# Patient Record
Sex: MALE | Race: WHITE
[De-identification: names, ages, dates, MRNs, and addresses within clinical notes are randomized per-mention and may not be internally consistent; named-entity substitution may affect disease eponyms.]

---

## 2017-06-17 ENCOUNTER — HOSPITAL ENCOUNTER (EMERGENCY)
Dept: HOSPITAL 15 - ER | Age: 77
Discharge: HOME | End: 2017-06-17
Payer: MEDICARE

## 2017-06-17 VITALS — SYSTOLIC BLOOD PRESSURE: 130 MMHG | DIASTOLIC BLOOD PRESSURE: 85 MMHG

## 2017-06-17 VITALS — WEIGHT: 215 LBS | BODY MASS INDEX: 29.12 KG/M2 | HEIGHT: 72 IN

## 2017-06-17 DIAGNOSIS — E11.9: ICD-10-CM

## 2017-06-17 DIAGNOSIS — Z88.8: ICD-10-CM

## 2017-06-17 DIAGNOSIS — F17.210: ICD-10-CM

## 2017-06-17 DIAGNOSIS — I10: ICD-10-CM

## 2017-06-17 DIAGNOSIS — T78.40XA: Primary | ICD-10-CM

## 2019-09-26 ENCOUNTER — HOSPITAL ENCOUNTER (INPATIENT)
Dept: HOSPITAL 15 - ER | Age: 79
LOS: 5 days | Discharge: NURSING, REHAB OR LONG-TERM CARE | DRG: 91 | End: 2019-10-01
Attending: INTERNAL MEDICINE | Admitting: NURSE PRACTITIONER
Payer: MEDICARE

## 2019-09-26 VITALS — HEIGHT: 73 IN | BODY MASS INDEX: 24.75 KG/M2 | WEIGHT: 186.73 LBS

## 2019-09-26 DIAGNOSIS — N18.9: ICD-10-CM

## 2019-09-26 DIAGNOSIS — F23: ICD-10-CM

## 2019-09-26 DIAGNOSIS — S16.1XXA: ICD-10-CM

## 2019-09-26 DIAGNOSIS — E11.21: ICD-10-CM

## 2019-09-26 DIAGNOSIS — M46.90: ICD-10-CM

## 2019-09-26 DIAGNOSIS — I27.21: ICD-10-CM

## 2019-09-26 DIAGNOSIS — Z79.899: ICD-10-CM

## 2019-09-26 DIAGNOSIS — N17.0: ICD-10-CM

## 2019-09-26 DIAGNOSIS — Z88.8: ICD-10-CM

## 2019-09-26 DIAGNOSIS — D63.8: ICD-10-CM

## 2019-09-26 DIAGNOSIS — Z82.0: ICD-10-CM

## 2019-09-26 DIAGNOSIS — I25.2: ICD-10-CM

## 2019-09-26 DIAGNOSIS — N39.0: ICD-10-CM

## 2019-09-26 DIAGNOSIS — I13.0: ICD-10-CM

## 2019-09-26 DIAGNOSIS — Z95.5: ICD-10-CM

## 2019-09-26 DIAGNOSIS — D72.829: ICD-10-CM

## 2019-09-26 DIAGNOSIS — E11.22: ICD-10-CM

## 2019-09-26 DIAGNOSIS — I50.22: ICD-10-CM

## 2019-09-26 DIAGNOSIS — I25.10: ICD-10-CM

## 2019-09-26 DIAGNOSIS — I27.20: ICD-10-CM

## 2019-09-26 DIAGNOSIS — I69.354: ICD-10-CM

## 2019-09-26 DIAGNOSIS — E44.0: ICD-10-CM
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
## 2019-09-26 DIAGNOSIS — M48.02: ICD-10-CM

## 2019-09-26 DIAGNOSIS — M25.78: ICD-10-CM

## 2019-09-26 DIAGNOSIS — M53.82: ICD-10-CM
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
## 2019-09-26 DIAGNOSIS — I69.393: ICD-10-CM

## 2019-09-26 DIAGNOSIS — S70.01XA: ICD-10-CM

## 2019-09-26 DIAGNOSIS — K21.9: ICD-10-CM

## 2019-09-26 DIAGNOSIS — G92: Primary | ICD-10-CM

## 2019-09-26 DIAGNOSIS — E11.40: ICD-10-CM

## 2019-09-26 DIAGNOSIS — E78.5: ICD-10-CM
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
## 2019-09-26 LAB
ALBUMIN SERPL-MCNC: 2.8 G/DL (ref 3.4–5)
ALP SERPL-CCNC: 83 U/L (ref 45–117)
ALT SERPL-CCNC: 46 U/L (ref 16–61)
ANION GAP SERPL CALCULATED.3IONS-SCNC: 6 MMOL/L (ref 5–15)
BILIRUB SERPL-MCNC: 0.6 MG/DL (ref 0.2–1)
BUN SERPL-MCNC: 20 MG/DL (ref 7–18)
BUN/CREAT SERPL: 21.1
CALCIUM SERPL-MCNC: 9 MG/DL (ref 8.5–10.1)
CHLORIDE SERPL-SCNC: 105 MMOL/L (ref 98–107)
CO2 SERPL-SCNC: 27 MMOL/L (ref 21–32)
GLUCOSE SERPL-MCNC: 145 MG/DL (ref 74–106)
HCT VFR BLD AUTO: 39.2 % (ref 41–53)
HGB BLD-MCNC: 12.9 G/DL (ref 13.5–17.5)
MCH RBC QN AUTO: 27.8 PG (ref 28–32)
MCV RBC AUTO: 84.8 FL (ref 80–100)
NRBC BLD QL AUTO: 0 %
POTASSIUM SERPL-SCNC: 4.1 MMOL/L (ref 3.5–5.1)
PROT SERPL-MCNC: 7.3 G/DL (ref 6.4–8.2)
SODIUM SERPL-SCNC: 138 MMOL/L (ref 136–145)

## 2019-09-26 PROCEDURE — 87081 CULTURE SCREEN ONLY: CPT

## 2019-09-26 PROCEDURE — 82962 GLUCOSE BLOOD TEST: CPT

## 2019-09-26 PROCEDURE — 84484 ASSAY OF TROPONIN QUANT: CPT

## 2019-09-26 PROCEDURE — 73502 X-RAY EXAM HIP UNI 2-3 VIEWS: CPT

## 2019-09-26 PROCEDURE — 96365 THER/PROPH/DIAG IV INF INIT: CPT

## 2019-09-26 PROCEDURE — 81001 URINALYSIS AUTO W/SCOPE: CPT

## 2019-09-26 PROCEDURE — 97530 THERAPEUTIC ACTIVITIES: CPT

## 2019-09-26 PROCEDURE — 96361 HYDRATE IV INFUSION ADD-ON: CPT

## 2019-09-26 PROCEDURE — 80048 BASIC METABOLIC PNL TOTAL CA: CPT

## 2019-09-26 PROCEDURE — 82533 TOTAL CORTISOL: CPT

## 2019-09-26 PROCEDURE — 97163 PT EVAL HIGH COMPLEX 45 MIN: CPT

## 2019-09-26 PROCEDURE — 36415 COLL VENOUS BLD VENIPUNCTURE: CPT

## 2019-09-26 PROCEDURE — 85025 COMPLETE CBC W/AUTO DIFF WBC: CPT

## 2019-09-26 PROCEDURE — 84443 ASSAY THYROID STIM HORMONE: CPT

## 2019-09-26 PROCEDURE — 72125 CT NECK SPINE W/O DYE: CPT

## 2019-09-26 PROCEDURE — 97110 THERAPEUTIC EXERCISES: CPT

## 2019-09-26 PROCEDURE — 83036 HEMOGLOBIN GLYCOSYLATED A1C: CPT

## 2019-09-26 PROCEDURE — 80053 COMPREHEN METABOLIC PANEL: CPT

## 2019-09-26 PROCEDURE — 70450 CT HEAD/BRAIN W/O DYE: CPT

## 2019-09-27 VITALS — DIASTOLIC BLOOD PRESSURE: 77 MMHG | SYSTOLIC BLOOD PRESSURE: 127 MMHG

## 2019-09-27 VITALS — SYSTOLIC BLOOD PRESSURE: 107 MMHG | DIASTOLIC BLOOD PRESSURE: 67 MMHG

## 2019-09-27 VITALS — DIASTOLIC BLOOD PRESSURE: 66 MMHG | SYSTOLIC BLOOD PRESSURE: 113 MMHG

## 2019-09-27 RX ADMIN — Medication SCH STRIP: at 17:38

## 2019-09-27 RX ADMIN — Medication SCH STRIP: at 11:32

## 2019-09-27 RX ADMIN — METOPROLOL TARTRATE SCH MG: 25 TABLET, FILM COATED ORAL at 21:37

## 2019-09-27 RX ADMIN — HUMAN INSULIN SCH UNITS: 100 INJECTION, SOLUTION SUBCUTANEOUS at 21:37

## 2019-09-27 RX ADMIN — SILDENAFIL CITRATE SCH MG: 20 TABLET, FILM COATED ORAL at 19:50

## 2019-09-27 RX ADMIN — Medication SCH STRIP: at 21:37

## 2019-09-27 RX ADMIN — CEFTRIAXONE SODIUM SCH MLS/HR: 1 INJECTION, POWDER, FOR SOLUTION INTRAMUSCULAR; INTRAVENOUS at 11:48

## 2019-09-27 RX ADMIN — HUMAN INSULIN SCH UNITS: 100 INJECTION, SOLUTION SUBCUTANEOUS at 11:30

## 2019-09-27 RX ADMIN — SILDENAFIL CITRATE SCH MG: 20 TABLET, FILM COATED ORAL at 14:17

## 2019-09-27 RX ADMIN — HUMAN INSULIN SCH UNITS: 100 INJECTION, SOLUTION SUBCUTANEOUS at 17:38

## 2019-09-27 NOTE — NUR
RECEIVED PATIENT FROM DAY SHIFT RN. PATIENT RESTING IN BED. NO S/S OF DISTRESS NOTED. DENIED 
PAIN FOR NOW. REORIENTED PATIENT FOR SITUATION. ORAL MEDICATION GIVEN AS ORDERED. PATIENT 
SWALLOWED WELL. NO S/S OF ASPIRATION NOTED. REPOSITIONED PATIENT TO COMFORT. POC INSTRUCTED 
AND ENCOURAGED PATIENT TO CALL FOR ASSISTANT IF NEEDED. BED IN LOWEST POSITION WITH SIDE 
RAILS UP X 2. CALL BELL WITHIN REACH. ALARM ON. SITTER AT BEDSIDE FOR SAFETY. CONTINUE TO 
MONITOR FOR CHANGES Q1H AND PRN.

## 2019-09-27 NOTE — NUR
ACCU-CHECK, BS 86. NO COVERAGE. ENCOURAGED PATIENT TO DRINK JUICY TO INCREASE BS. CONTINUE 
TO MONITOR.

## 2019-09-27 NOTE — NUR
Telemetry admit from ER

AYAKA STARKEY admitted to Telemetry unit after SBAR received. Patient oriented to KEITH MCDOWELL RN primary RN, unit, room, bed, and unit policies regarding patient care and 
visiting hours. Sitter is at bedside for 24 hour monitoring and care. Patient now on 
continuous telemetry monitoring, tele box # 65 and telemetry reading on arrival to unit is 
normal sinus rhythm at 98 bpm. Patient weighed by bed scale and encouraged to call if they 
need something. All questions and concerns addressed, patient verbalized understanding to 
the best of his ability.

## 2019-09-28 VITALS — DIASTOLIC BLOOD PRESSURE: 62 MMHG | SYSTOLIC BLOOD PRESSURE: 110 MMHG

## 2019-09-28 VITALS — DIASTOLIC BLOOD PRESSURE: 69 MMHG | SYSTOLIC BLOOD PRESSURE: 102 MMHG

## 2019-09-28 VITALS — SYSTOLIC BLOOD PRESSURE: 107 MMHG | DIASTOLIC BLOOD PRESSURE: 70 MMHG

## 2019-09-28 VITALS — SYSTOLIC BLOOD PRESSURE: 110 MMHG | DIASTOLIC BLOOD PRESSURE: 74 MMHG

## 2019-09-28 VITALS — SYSTOLIC BLOOD PRESSURE: 119 MMHG | DIASTOLIC BLOOD PRESSURE: 79 MMHG

## 2019-09-28 VITALS — SYSTOLIC BLOOD PRESSURE: 127 MMHG | DIASTOLIC BLOOD PRESSURE: 72 MMHG

## 2019-09-28 LAB
ANION GAP SERPL CALCULATED.3IONS-SCNC: 8 MMOL/L (ref 5–15)
BUN SERPL-MCNC: 14 MG/DL (ref 7–18)
BUN/CREAT SERPL: 17.1
CALCIUM SERPL-MCNC: 8.7 MG/DL (ref 8.5–10.1)
CHLORIDE SERPL-SCNC: 104 MMOL/L (ref 98–107)
CO2 SERPL-SCNC: 26 MMOL/L (ref 21–32)
GLUCOSE SERPL-MCNC: 121 MG/DL (ref 74–106)
HCT VFR BLD AUTO: 36.3 % (ref 41–53)
HGB BLD-MCNC: 12.1 G/DL (ref 13.5–17.5)
MCH RBC QN AUTO: 28.3 PG (ref 28–32)
MCV RBC AUTO: 84.9 FL (ref 80–100)
NRBC BLD QL AUTO: 0 %
POTASSIUM SERPL-SCNC: 3.9 MMOL/L (ref 3.5–5.1)
SODIUM SERPL-SCNC: 138 MMOL/L (ref 136–145)

## 2019-09-28 RX ADMIN — Medication SCH STRIP: at 06:42

## 2019-09-28 RX ADMIN — Medication SCH ML: at 16:57

## 2019-09-28 RX ADMIN — SILDENAFIL CITRATE SCH MG: 20 TABLET, FILM COATED ORAL at 14:15

## 2019-09-28 RX ADMIN — HUMAN INSULIN SCH UNITS: 100 INJECTION, SOLUTION SUBCUTANEOUS at 16:56

## 2019-09-28 RX ADMIN — METOPROLOL TARTRATE SCH MG: 25 TABLET, FILM COATED ORAL at 10:57

## 2019-09-28 RX ADMIN — CEFTRIAXONE SODIUM SCH MLS/HR: 1 INJECTION, POWDER, FOR SOLUTION INTRAMUSCULAR; INTRAVENOUS at 08:52

## 2019-09-28 RX ADMIN — METOPROLOL TARTRATE SCH MG: 25 TABLET, FILM COATED ORAL at 22:00

## 2019-09-28 RX ADMIN — Medication SCH STRIP: at 22:00

## 2019-09-28 RX ADMIN — ASPIRIN SCH MG: 81 TABLET ORAL at 10:55

## 2019-09-28 RX ADMIN — HUMAN INSULIN SCH UNITS: 100 INJECTION, SOLUTION SUBCUTANEOUS at 06:42

## 2019-09-28 RX ADMIN — Medication SCH STRIP: at 16:56

## 2019-09-28 RX ADMIN — LISINOPRIL SCH MG: 5 TABLET ORAL at 10:56

## 2019-09-28 RX ADMIN — CLOPIDOGREL BISULFATE SCH MG: 75 TABLET ORAL at 10:56

## 2019-09-28 RX ADMIN — HUMAN INSULIN SCH UNITS: 100 INJECTION, SOLUTION SUBCUTANEOUS at 11:55

## 2019-09-28 RX ADMIN — Medication SCH STRIP: at 11:55

## 2019-09-28 RX ADMIN — CEFTRIAXONE SODIUM SCH MLS/HR: 1 INJECTION, POWDER, FOR SOLUTION INTRAMUSCULAR; INTRAVENOUS at 08:51

## 2019-09-28 RX ADMIN — HYDROCODONE BITARTRATE AND ACETAMINOPHEN PRN TAB: 5; 325 TABLET ORAL at 11:56

## 2019-09-28 RX ADMIN — SILDENAFIL CITRATE SCH MG: 20 TABLET, FILM COATED ORAL at 08:51

## 2019-09-28 RX ADMIN — BACLOFEN PRN MG: 10 TABLET ORAL at 16:55

## 2019-09-28 RX ADMIN — FAMOTIDINE SCH MG: 20 TABLET, FILM COATED ORAL at 10:56

## 2019-09-28 RX ADMIN — HUMAN INSULIN SCH UNITS: 100 INJECTION, SOLUTION SUBCUTANEOUS at 22:00

## 2019-09-28 RX ADMIN — SILDENAFIL CITRATE SCH MG: 20 TABLET, FILM COATED ORAL at 22:26

## 2019-09-28 NOTE — NUR
Opening Shift Note

Assumed care of patient, awake and alert x3.  No S/S of distress/SOB or pain.  Instructed on 
POC and to call for assist PRN, will continue to monitor. Bed locked in the lowest position. 
Bed rails up x2. Call light in reach. Sitter at the bedside for safety.

## 2019-09-28 NOTE — NUR
Closing Note

Patient is awake and alert x3.  No S/S of distress/SOB or pain.  Bed locked in the lowest 
position. Bed rails up x2. Call light in reach. Sitter at the bedside for safety. Endorsed 
care to night nurse.

## 2019-09-28 NOTE — NUR
MED REQUEST 

PATIENT REQUESTING MEDICATION OF BACLOFEN. PATIENT STATES, "I WAS GETTING BACLOFEN BEFORE 
AND THAT REALLY HELPED WITH MY CHRONIC BACK PAIN. CAN YOU ASK THE DOCTOR IF I CAN GET AN 
ORDER FOR THAT." DR. BARBOSA AWARE OF PATIENTS REQUEST. NEW ORDER RECEIVED. ORDER READ BACK 
AND VERIFIED.

## 2019-09-29 VITALS — SYSTOLIC BLOOD PRESSURE: 107 MMHG | DIASTOLIC BLOOD PRESSURE: 67 MMHG

## 2019-09-29 VITALS — DIASTOLIC BLOOD PRESSURE: 68 MMHG | SYSTOLIC BLOOD PRESSURE: 110 MMHG

## 2019-09-29 VITALS — SYSTOLIC BLOOD PRESSURE: 137 MMHG | DIASTOLIC BLOOD PRESSURE: 78 MMHG

## 2019-09-29 VITALS — DIASTOLIC BLOOD PRESSURE: 78 MMHG | SYSTOLIC BLOOD PRESSURE: 111 MMHG

## 2019-09-29 VITALS — SYSTOLIC BLOOD PRESSURE: 141 MMHG | DIASTOLIC BLOOD PRESSURE: 81 MMHG

## 2019-09-29 LAB
ANION GAP SERPL CALCULATED.3IONS-SCNC: 12 MMOL/L (ref 5–15)
BUN SERPL-MCNC: 14 MG/DL (ref 7–18)
BUN/CREAT SERPL: 17.5
CALCIUM SERPL-MCNC: 8.9 MG/DL (ref 8.5–10.1)
CHLORIDE SERPL-SCNC: 103 MMOL/L (ref 98–107)
CO2 SERPL-SCNC: 22 MMOL/L (ref 21–32)
GLUCOSE SERPL-MCNC: 137 MG/DL (ref 74–106)
HCT VFR BLD AUTO: 37.9 % (ref 41–53)
HGB BLD-MCNC: 12.8 G/DL (ref 13.5–17.5)
MCH RBC QN AUTO: 28.5 PG (ref 28–32)
MCV RBC AUTO: 84.6 FL (ref 80–100)
NRBC BLD QL AUTO: 0.1 %
POTASSIUM SERPL-SCNC: 3.7 MMOL/L (ref 3.5–5.1)
SODIUM SERPL-SCNC: 137 MMOL/L (ref 136–145)

## 2019-09-29 RX ADMIN — HUMAN INSULIN SCH UNITS: 100 INJECTION, SOLUTION SUBCUTANEOUS at 22:07

## 2019-09-29 RX ADMIN — METOPROLOL TARTRATE SCH MG: 25 TABLET, FILM COATED ORAL at 22:08

## 2019-09-29 RX ADMIN — Medication SCH STRIP: at 22:08

## 2019-09-29 RX ADMIN — CEFTRIAXONE SODIUM SCH MLS/HR: 1 INJECTION, POWDER, FOR SOLUTION INTRAMUSCULAR; INTRAVENOUS at 09:02

## 2019-09-29 RX ADMIN — HYDROCODONE BITARTRATE AND ACETAMINOPHEN PRN TAB: 5; 325 TABLET ORAL at 09:02

## 2019-09-29 RX ADMIN — Medication SCH ML: at 12:32

## 2019-09-29 RX ADMIN — Medication SCH ML: at 18:26

## 2019-09-29 RX ADMIN — Medication SCH STRIP: at 12:32

## 2019-09-29 RX ADMIN — LISINOPRIL SCH MG: 5 TABLET ORAL at 09:05

## 2019-09-29 RX ADMIN — Medication SCH STRIP: at 06:47

## 2019-09-29 RX ADMIN — CLOPIDOGREL BISULFATE SCH MG: 75 TABLET ORAL at 09:04

## 2019-09-29 RX ADMIN — SILDENAFIL CITRATE SCH MG: 20 TABLET, FILM COATED ORAL at 20:47

## 2019-09-29 RX ADMIN — FAMOTIDINE SCH MG: 20 TABLET, FILM COATED ORAL at 09:03

## 2019-09-29 RX ADMIN — SILDENAFIL CITRATE SCH MG: 20 TABLET, FILM COATED ORAL at 09:02

## 2019-09-29 RX ADMIN — METOPROLOL TARTRATE SCH MG: 25 TABLET, FILM COATED ORAL at 09:04

## 2019-09-29 RX ADMIN — SILDENAFIL CITRATE SCH MG: 20 TABLET, FILM COATED ORAL at 16:21

## 2019-09-29 RX ADMIN — ASPIRIN SCH MG: 81 TABLET ORAL at 09:04

## 2019-09-29 RX ADMIN — Medication SCH ML: at 09:07

## 2019-09-29 RX ADMIN — HUMAN INSULIN SCH UNITS: 100 INJECTION, SOLUTION SUBCUTANEOUS at 06:47

## 2019-09-29 RX ADMIN — HUMAN INSULIN SCH UNITS: 100 INJECTION, SOLUTION SUBCUTANEOUS at 17:14

## 2019-09-29 RX ADMIN — Medication SCH STRIP: at 17:14

## 2019-09-29 RX ADMIN — HUMAN INSULIN SCH UNITS: 100 INJECTION, SOLUTION SUBCUTANEOUS at 12:32

## 2019-09-29 NOTE — NUR
PT TURNING SELF; AND PERIODICALLY TRYING TO KICK STAFF. INTERMITTENT PERIODS OF CALM WHEN PT 
SPORADICALLY FALLS ASLEEP;VITAL SIGNS STABLE; SITTER AT BEDSIDE SIDERAILS UP.

## 2019-09-29 NOTE — NUR
Opening Shift Note

Assumed care of patient, awake and alert x4. No S/S of distress/SOB or pain. Call light is 
within reach, side rails up x2, bed is in lowest position. Sitter is at bedside for safety. 
Instructed on POC and to call for assist PRN, will continue to monitor for changes Q1hr and 
PRN.

## 2019-09-30 VITALS — DIASTOLIC BLOOD PRESSURE: 87 MMHG | SYSTOLIC BLOOD PRESSURE: 141 MMHG

## 2019-09-30 VITALS — SYSTOLIC BLOOD PRESSURE: 102 MMHG | DIASTOLIC BLOOD PRESSURE: 62 MMHG

## 2019-09-30 VITALS — SYSTOLIC BLOOD PRESSURE: 124 MMHG | DIASTOLIC BLOOD PRESSURE: 74 MMHG

## 2019-09-30 VITALS — DIASTOLIC BLOOD PRESSURE: 68 MMHG | SYSTOLIC BLOOD PRESSURE: 116 MMHG

## 2019-09-30 VITALS — SYSTOLIC BLOOD PRESSURE: 141 MMHG | DIASTOLIC BLOOD PRESSURE: 87 MMHG

## 2019-09-30 VITALS — DIASTOLIC BLOOD PRESSURE: 81 MMHG | SYSTOLIC BLOOD PRESSURE: 117 MMHG

## 2019-09-30 LAB
ANION GAP SERPL CALCULATED.3IONS-SCNC: 9 MMOL/L (ref 5–15)
BUN SERPL-MCNC: 14 MG/DL (ref 7–18)
BUN/CREAT SERPL: 17.3
CALCIUM SERPL-MCNC: 8.7 MG/DL (ref 8.5–10.1)
CHLORIDE SERPL-SCNC: 103 MMOL/L (ref 98–107)
CO2 SERPL-SCNC: 24 MMOL/L (ref 21–32)
GLUCOSE SERPL-MCNC: 148 MG/DL (ref 74–106)
HCT VFR BLD AUTO: 37.6 % (ref 41–53)
HGB BLD-MCNC: 12.5 G/DL (ref 13.5–17.5)
MCH RBC QN AUTO: 28.4 PG (ref 28–32)
MCV RBC AUTO: 85.2 FL (ref 80–100)
NRBC BLD QL AUTO: 0 %
POTASSIUM SERPL-SCNC: 3.7 MMOL/L (ref 3.5–5.1)
SODIUM SERPL-SCNC: 136 MMOL/L (ref 136–145)

## 2019-09-30 RX ADMIN — BACLOFEN PRN MG: 10 TABLET ORAL at 21:27

## 2019-09-30 RX ADMIN — HUMAN INSULIN SCH UNITS: 100 INJECTION, SOLUTION SUBCUTANEOUS at 06:26

## 2019-09-30 RX ADMIN — CEFTRIAXONE SODIUM SCH MLS/HR: 1 INJECTION, POWDER, FOR SOLUTION INTRAMUSCULAR; INTRAVENOUS at 11:19

## 2019-09-30 RX ADMIN — HUMAN INSULIN SCH UNITS: 100 INJECTION, SOLUTION SUBCUTANEOUS at 18:23

## 2019-09-30 RX ADMIN — Medication SCH STRIP: at 21:27

## 2019-09-30 RX ADMIN — SILDENAFIL CITRATE SCH MG: 20 TABLET, FILM COATED ORAL at 13:53

## 2019-09-30 RX ADMIN — Medication SCH ML: at 18:24

## 2019-09-30 RX ADMIN — Medication SCH ML: at 12:29

## 2019-09-30 RX ADMIN — METOPROLOL TARTRATE SCH MG: 25 TABLET, FILM COATED ORAL at 21:38

## 2019-09-30 RX ADMIN — Medication SCH STRIP: at 12:29

## 2019-09-30 RX ADMIN — CLOPIDOGREL BISULFATE SCH MG: 75 TABLET ORAL at 11:19

## 2019-09-30 RX ADMIN — LISINOPRIL SCH MG: 5 TABLET ORAL at 11:21

## 2019-09-30 RX ADMIN — HUMAN INSULIN SCH UNITS: 100 INJECTION, SOLUTION SUBCUTANEOUS at 21:27

## 2019-09-30 RX ADMIN — Medication SCH STRIP: at 06:26

## 2019-09-30 RX ADMIN — SILDENAFIL CITRATE SCH MG: 20 TABLET, FILM COATED ORAL at 21:26

## 2019-09-30 RX ADMIN — Medication SCH STRIP: at 18:24

## 2019-09-30 RX ADMIN — FAMOTIDINE SCH MG: 20 TABLET, FILM COATED ORAL at 11:20

## 2019-09-30 RX ADMIN — SILDENAFIL CITRATE SCH MG: 20 TABLET, FILM COATED ORAL at 11:19

## 2019-09-30 RX ADMIN — HUMAN INSULIN SCH UNITS: 100 INJECTION, SOLUTION SUBCUTANEOUS at 12:28

## 2019-09-30 RX ADMIN — METOPROLOL TARTRATE SCH MG: 25 TABLET, FILM COATED ORAL at 11:20

## 2019-09-30 RX ADMIN — ASPIRIN SCH MG: 81 TABLET ORAL at 11:21

## 2019-09-30 RX ADMIN — Medication SCH ML: at 11:22

## 2019-10-01 VITALS — DIASTOLIC BLOOD PRESSURE: 63 MMHG | SYSTOLIC BLOOD PRESSURE: 124 MMHG

## 2019-10-01 VITALS — DIASTOLIC BLOOD PRESSURE: 70 MMHG | SYSTOLIC BLOOD PRESSURE: 118 MMHG

## 2019-10-01 VITALS — DIASTOLIC BLOOD PRESSURE: 64 MMHG | SYSTOLIC BLOOD PRESSURE: 103 MMHG

## 2019-10-01 VITALS — DIASTOLIC BLOOD PRESSURE: 91 MMHG | SYSTOLIC BLOOD PRESSURE: 133 MMHG

## 2019-10-01 VITALS — SYSTOLIC BLOOD PRESSURE: 119 MMHG | DIASTOLIC BLOOD PRESSURE: 71 MMHG

## 2019-10-01 LAB
ALBUMIN SERPL-MCNC: 2.7 G/DL (ref 3.4–5)
ALP SERPL-CCNC: 81 U/L (ref 45–117)
ALT SERPL-CCNC: 22 U/L (ref 16–61)
ANION GAP SERPL CALCULATED.3IONS-SCNC: 11 MMOL/L (ref 5–15)
BILIRUB SERPL-MCNC: 0.7 MG/DL (ref 0.2–1)
BUN SERPL-MCNC: 13 MG/DL (ref 7–18)
BUN/CREAT SERPL: 16.3
CALCIUM SERPL-MCNC: 8.9 MG/DL (ref 8.5–10.1)
CHLORIDE SERPL-SCNC: 102 MMOL/L (ref 98–107)
CO2 SERPL-SCNC: 23 MMOL/L (ref 21–32)
GLUCOSE SERPL-MCNC: 154 MG/DL (ref 74–106)
HCT VFR BLD AUTO: 38.5 % (ref 41–53)
HGB BLD-MCNC: 12.8 G/DL (ref 13.5–17.5)
MCH RBC QN AUTO: 28.6 PG (ref 28–32)
MCV RBC AUTO: 86.1 FL (ref 80–100)
NRBC BLD QL AUTO: 0 %
POTASSIUM SERPL-SCNC: 3.9 MMOL/L (ref 3.5–5.1)
PROT SERPL-MCNC: 7.3 G/DL (ref 6.4–8.2)
SODIUM SERPL-SCNC: 136 MMOL/L (ref 136–145)

## 2019-10-01 RX ADMIN — LISINOPRIL SCH MG: 5 TABLET ORAL at 09:30

## 2019-10-01 RX ADMIN — FAMOTIDINE SCH MG: 20 TABLET, FILM COATED ORAL at 09:30

## 2019-10-01 RX ADMIN — HUMAN INSULIN SCH UNITS: 100 INJECTION, SOLUTION SUBCUTANEOUS at 12:24

## 2019-10-01 RX ADMIN — SILDENAFIL CITRATE SCH MG: 20 TABLET, FILM COATED ORAL at 20:05

## 2019-10-01 RX ADMIN — Medication SCH STRIP: at 12:24

## 2019-10-01 RX ADMIN — HUMAN INSULIN SCH UNITS: 100 INJECTION, SOLUTION SUBCUTANEOUS at 17:55

## 2019-10-01 RX ADMIN — Medication SCH ML: at 12:24

## 2019-10-01 RX ADMIN — Medication SCH ML: at 08:17

## 2019-10-01 RX ADMIN — SILDENAFIL CITRATE SCH MG: 20 TABLET, FILM COATED ORAL at 13:57

## 2019-10-01 RX ADMIN — METOPROLOL TARTRATE SCH MG: 25 TABLET, FILM COATED ORAL at 09:30

## 2019-10-01 RX ADMIN — SILDENAFIL CITRATE SCH MG: 20 TABLET, FILM COATED ORAL at 08:17

## 2019-10-01 RX ADMIN — CEFTRIAXONE SODIUM SCH MLS/HR: 1 INJECTION, POWDER, FOR SOLUTION INTRAMUSCULAR; INTRAVENOUS at 08:17

## 2019-10-01 RX ADMIN — CLOPIDOGREL BISULFATE SCH MG: 75 TABLET ORAL at 09:30

## 2019-10-01 RX ADMIN — Medication SCH STRIP: at 17:56

## 2019-10-01 RX ADMIN — ASPIRIN SCH MG: 81 TABLET ORAL at 09:31

## 2019-10-01 RX ADMIN — HUMAN INSULIN SCH UNITS: 100 INJECTION, SOLUTION SUBCUTANEOUS at 06:15

## 2019-10-01 RX ADMIN — Medication SCH STRIP: at 06:15

## 2019-10-01 RX ADMIN — Medication SCH ML: at 17:38

## 2019-10-01 NOTE — NUR
Assessment

Pt is a 78 year old confused and disoriented male. Pt was oriented to Place, date, Name, and 
President but couldn't remember where he lived or his wife's name. Pt was admitted due to a 
fall and a bad hemorrhage that resulted in a stroke. Pt was unable to answer basic questions 
about DME, Primary care physician, and functioning level. BHANU found out from  that pt 
lived at [facility] prior to admit. BHANU called [facility] and talked with staff about the 
pt. BHANU found out that pt had been at [facility] for 10 days for Rehab and PT. Pt has a live 
in caregiver who is the pt's POA and can be contacted at [phone]. BHANU called Ness and 
asked questions about the pt. Ness stated that prior to living at [facility], pt lived 
with her and she provided care giving services including cooking, cleaning, etc. POA stated 
that she decided that pt will go back to [facility] to complete rehab. POA informed that pt 
has had signs of dementia for a while but has not had a successful outcome when trying to 
get him in for an apt with his primary dr. [facility] [place] 
POA stated that she can give the pt a ride, if need be. Pt will be d/c to [facility] per 
POA's decision.

-------------------------------------------------------------------------------

Addendum: 10/01/19 at 0958 by TOYIN NAIR SS

-------------------------------------------------------------------------------

Amended: Links added.

## 2019-10-01 NOTE — NUR
OPENING SHIFT NOTE

RECEIVED REPORT FROM DAYSHIFT RN. PATIENT LYING IN BED. NO S/S OF DISTRESS OR SOB. NO PAIN 
NOTED OR REPORTED. PATIENT A/O X3, BEDREST. PATIENT UPDATED ON POC AND PENDING TRANSFER TO 
[facility], VERBALIZED UNDERSTANDING. BED LOCKED IN LOW POSITION, CALL LIGHT WITHIN REACH. 
WILL CONTINUE TO MONITOR PATIENT Q1HR AND PRN.

## 2019-10-01 NOTE — NUR
Discharge planning per  consult has order for SNF placement. [facility]ed [facility] 
as patient was a patient there previously, follow up call with Renee advised that 
patient is able to return and will be going to room 2 bed A under Dr. Caballero. [facility] 
[facility] and they are scheduled to pick the patient up at 10pm. Nurse advised of dc 
plan. 

-------------------------------------------------------------------------------

Addendum: 10/02/19 at 0829 by LUCITA HOSKINS 

-------------------------------------------------------------------------------

Amended: Links added.

## 2019-10-01 NOTE — NUR
PATIENT DISCHARGED TO [facility]

Discharge instructions given as ordered. All questions and concerns addressed. Patient 
verbalized understanding. IV removed with catheter intact, pressure dressing applied, TELE 
MONITOR SENT BACK TO MONITOR TECH ROOM. Medication reconciliation form completed and copy 
given to patient. Telemetry unit returned to ICU. Report given to BRITTANY at [facility]. 
Patient transported with all personal belongings. No distress noted at time of departure.

## 2019-10-02 ENCOUNTER — HOSPITAL ENCOUNTER (INPATIENT)
Dept: HOSPITAL 15 - ER | Age: 79
LOS: 5 days | Discharge: HOME | DRG: 92 | End: 2019-10-07
Attending: INTERNAL MEDICINE | Admitting: INTERNAL MEDICINE
Payer: COMMERCIAL

## 2019-10-02 VITALS — HEIGHT: 72 IN | WEIGHT: 197.53 LBS | BODY MASS INDEX: 26.76 KG/M2

## 2019-10-02 DIAGNOSIS — E11.42: ICD-10-CM

## 2019-10-02 DIAGNOSIS — E44.0: ICD-10-CM

## 2019-10-02 DIAGNOSIS — E87.6: ICD-10-CM

## 2019-10-02 DIAGNOSIS — I27.21: ICD-10-CM

## 2019-10-02 DIAGNOSIS — Z79.02: ICD-10-CM

## 2019-10-02 DIAGNOSIS — G92: Primary | ICD-10-CM

## 2019-10-02 DIAGNOSIS — Z95.5: ICD-10-CM

## 2019-10-02 DIAGNOSIS — I69.354: ICD-10-CM

## 2019-10-02 DIAGNOSIS — I50.22: ICD-10-CM

## 2019-10-02 DIAGNOSIS — I11.0: ICD-10-CM

## 2019-10-02 DIAGNOSIS — K21.9: ICD-10-CM

## 2019-10-02 DIAGNOSIS — E86.0: ICD-10-CM

## 2019-10-02 DIAGNOSIS — Z88.8: ICD-10-CM

## 2019-10-02 DIAGNOSIS — I25.10: ICD-10-CM

## 2019-10-02 DIAGNOSIS — E78.5: ICD-10-CM

## 2019-10-02 DIAGNOSIS — Z79.82: ICD-10-CM

## 2019-10-02 DIAGNOSIS — I10: ICD-10-CM

## 2019-10-02 LAB
ALBUMIN SERPL-MCNC: 2.5 G/DL (ref 3.4–5)
ALP SERPL-CCNC: 85 U/L (ref 45–117)
ALT SERPL-CCNC: 19 U/L (ref 16–61)
ANION GAP SERPL CALCULATED.3IONS-SCNC: 11 MMOL/L (ref 5–15)
BILIRUB SERPL-MCNC: 0.7 MG/DL (ref 0.2–1)
BUN SERPL-MCNC: 17 MG/DL (ref 7–18)
BUN/CREAT SERPL: 22.7
CALCIUM SERPL-MCNC: 8.8 MG/DL (ref 8.5–10.1)
CHLORIDE SERPL-SCNC: 103 MMOL/L (ref 98–107)
CO2 SERPL-SCNC: 21 MMOL/L (ref 21–32)
GLUCOSE SERPL-MCNC: 210 MG/DL (ref 74–106)
HCT VFR BLD AUTO: 38.1 % (ref 41–53)
HGB BLD-MCNC: 12.8 G/DL (ref 13.5–17.5)
MAGNESIUM SERPL-MCNC: 1.6 MG/DL (ref 1.6–2.6)
MCH RBC QN AUTO: 28.4 PG (ref 28–32)
MCV RBC AUTO: 84.2 FL (ref 80–100)
NRBC BLD QL AUTO: 0 %
POTASSIUM SERPL-SCNC: 3.9 MMOL/L (ref 3.5–5.1)
PROT SERPL-MCNC: 7.1 G/DL (ref 6.4–8.2)
SODIUM SERPL-SCNC: 135 MMOL/L (ref 136–145)

## 2019-10-02 PROCEDURE — 85025 COMPLETE CBC W/AUTO DIFF WBC: CPT

## 2019-10-02 PROCEDURE — 36415 COLL VENOUS BLD VENIPUNCTURE: CPT

## 2019-10-02 PROCEDURE — 97530 THERAPEUTIC ACTIVITIES: CPT

## 2019-10-02 PROCEDURE — 93005 ELECTROCARDIOGRAM TRACING: CPT

## 2019-10-02 PROCEDURE — 84484 ASSAY OF TROPONIN QUANT: CPT

## 2019-10-02 PROCEDURE — 83605 ASSAY OF LACTIC ACID: CPT

## 2019-10-02 PROCEDURE — 81001 URINALYSIS AUTO W/SCOPE: CPT

## 2019-10-02 PROCEDURE — 87081 CULTURE SCREEN ONLY: CPT

## 2019-10-02 PROCEDURE — 80048 BASIC METABOLIC PNL TOTAL CA: CPT

## 2019-10-02 PROCEDURE — 82962 GLUCOSE BLOOD TEST: CPT

## 2019-10-02 PROCEDURE — 80053 COMPREHEN METABOLIC PANEL: CPT

## 2019-10-02 PROCEDURE — 70450 CT HEAD/BRAIN W/O DYE: CPT

## 2019-10-02 PROCEDURE — 87086 URINE CULTURE/COLONY COUNT: CPT

## 2019-10-02 PROCEDURE — 71045 X-RAY EXAM CHEST 1 VIEW: CPT

## 2019-10-02 PROCEDURE — 97116 GAIT TRAINING THERAPY: CPT

## 2019-10-02 PROCEDURE — 87040 BLOOD CULTURE FOR BACTERIA: CPT

## 2019-10-02 PROCEDURE — 83735 ASSAY OF MAGNESIUM: CPT

## 2019-10-02 RX ADMIN — Medication SCH STRIP: at 22:28

## 2019-10-02 RX ADMIN — HUMAN INSULIN SCH UNITS: 100 INJECTION, SOLUTION SUBCUTANEOUS at 22:00

## 2019-10-02 RX ADMIN — SODIUM CHLORIDE SCH MLS/HR: 0.9 INJECTION, SOLUTION INTRAVENOUS at 21:18

## 2019-10-02 RX ADMIN — FAMOTIDINE SCH MG: 20 TABLET, FILM COATED ORAL at 22:28

## 2019-10-02 RX ADMIN — ATORVASTATIN CALCIUM SCH MG: 20 TABLET, FILM COATED ORAL at 22:28

## 2019-10-03 VITALS — SYSTOLIC BLOOD PRESSURE: 125 MMHG | DIASTOLIC BLOOD PRESSURE: 72 MMHG

## 2019-10-03 VITALS — DIASTOLIC BLOOD PRESSURE: 69 MMHG | SYSTOLIC BLOOD PRESSURE: 132 MMHG

## 2019-10-03 VITALS — SYSTOLIC BLOOD PRESSURE: 132 MMHG | DIASTOLIC BLOOD PRESSURE: 69 MMHG

## 2019-10-03 VITALS — DIASTOLIC BLOOD PRESSURE: 92 MMHG | SYSTOLIC BLOOD PRESSURE: 120 MMHG

## 2019-10-03 VITALS — SYSTOLIC BLOOD PRESSURE: 126 MMHG | DIASTOLIC BLOOD PRESSURE: 86 MMHG

## 2019-10-03 VITALS — DIASTOLIC BLOOD PRESSURE: 78 MMHG | SYSTOLIC BLOOD PRESSURE: 134 MMHG

## 2019-10-03 VITALS — SYSTOLIC BLOOD PRESSURE: 126 MMHG | DIASTOLIC BLOOD PRESSURE: 87 MMHG

## 2019-10-03 LAB
ANION GAP SERPL CALCULATED.3IONS-SCNC: 8 MMOL/L (ref 5–15)
BUN SERPL-MCNC: 13 MG/DL (ref 7–18)
BUN/CREAT SERPL: 23.6
CALCIUM SERPL-MCNC: 8.5 MG/DL (ref 8.5–10.1)
CHLORIDE SERPL-SCNC: 107 MMOL/L (ref 98–107)
CO2 SERPL-SCNC: 24 MMOL/L (ref 21–32)
GLUCOSE SERPL-MCNC: 143 MG/DL (ref 74–106)
HCT VFR BLD AUTO: 36.4 % (ref 41–53)
HGB BLD-MCNC: 11.6 G/DL (ref 13.5–17.5)
MCH RBC QN AUTO: 28.4 PG (ref 28–32)
MCV RBC AUTO: 88.9 FL (ref 80–100)
NRBC BLD QL AUTO: 0.1 %
POTASSIUM SERPL-SCNC: 3.6 MMOL/L (ref 3.5–5.1)
SODIUM SERPL-SCNC: 139 MMOL/L (ref 136–145)

## 2019-10-03 RX ADMIN — HYDROCODONE BITARTRATE AND ACETAMINOPHEN PRN TAB: 5; 325 TABLET ORAL at 23:52

## 2019-10-03 RX ADMIN — Medication SCH STRIP: at 13:12

## 2019-10-03 RX ADMIN — CEFTRIAXONE SODIUM SCH MLS/HR: 1 INJECTION, POWDER, FOR SOLUTION INTRAMUSCULAR; INTRAVENOUS at 09:29

## 2019-10-03 RX ADMIN — HUMAN INSULIN SCH UNITS: 100 INJECTION, SOLUTION SUBCUTANEOUS at 06:18

## 2019-10-03 RX ADMIN — HUMAN INSULIN SCH UNITS: 100 INJECTION, SOLUTION SUBCUTANEOUS at 17:00

## 2019-10-03 RX ADMIN — FAMOTIDINE SCH MG: 20 TABLET, FILM COATED ORAL at 21:47

## 2019-10-03 RX ADMIN — CLOPIDOGREL BISULFATE SCH MG: 75 TABLET ORAL at 09:31

## 2019-10-03 RX ADMIN — Medication SCH STRIP: at 06:18

## 2019-10-03 RX ADMIN — HUMAN INSULIN SCH UNITS: 100 INJECTION, SOLUTION SUBCUTANEOUS at 21:56

## 2019-10-03 RX ADMIN — HUMAN INSULIN SCH UNITS: 100 INJECTION, SOLUTION SUBCUTANEOUS at 11:30

## 2019-10-03 RX ADMIN — FAMOTIDINE SCH MG: 20 TABLET, FILM COATED ORAL at 09:31

## 2019-10-03 RX ADMIN — Medication SCH STRIP: at 21:47

## 2019-10-03 RX ADMIN — SODIUM CHLORIDE SCH MLS/HR: 0.9 INJECTION, SOLUTION INTRAVENOUS at 18:06

## 2019-10-03 RX ADMIN — Medication SCH STRIP: at 17:00

## 2019-10-03 RX ADMIN — Medication SCH ML: at 12:00

## 2019-10-03 RX ADMIN — ATORVASTATIN CALCIUM SCH MG: 20 TABLET, FILM COATED ORAL at 21:47

## 2019-10-03 NOTE — NUR
PATIENT C/O PAIN TO LEFT KNEE 5/10 AND RIGHT HIP PAIN 8/10. NO PAIN MEDICATION ON ORDERS.  
WILL CALL MD FOR MEDICATION.

## 2019-10-03 NOTE — NUR
Patient agreed for a student nurse to interview him. 

During the interview the patient informed the student that he is refusing to eat or drink 
everything because he is not comfortable with the hospital. According to the sitter he does 
not have trust and the hospital withheld medications resulting in him having a stroke.

## 2019-10-03 NOTE — NUR
Opening Shift Note

Assumed care of patient, awake and alert, sitter at bedside for safety.  No S/S of 
distress/SOB or pain.  Instructed on POC and to call for assist PRN, will continue to 
monitor for changes Q1hr and PRN.

## 2019-10-03 NOTE — NUR
Telemetry admit from ER

AYAKA STARKEY admitted to Telemetry unit after SBAR received.  Patient oriented to Luz Bobby, primary RN, unit, room, bed, and unit policies regarding patient care and visiting 
hours. Patient now on continuous telemetry monitoring, tele box # [32] and telemetry reading 
on arrival to unit is [SR 92]. Patient weighed by bedscale and encouraged to call if they 
need something. All questions and concerns addressed, patient verbalized understanding. 

Note: PATIENT CONFUSED. ORIENTED PATIENT TIME, PLACE, AND SITUATION. PATIENT COULD VERBALIZE 
UNDERSTANDING. SITTER AT BEDSIDE FOR SAFETY. CONTINUE CARE.

## 2019-10-03 NOTE — NUR
MD Called/paged

David Guevara NP called re: paint to left knee and right hip . Waiting for call back. 
Continue care.

## 2019-10-03 NOTE — NUR
Opening Shift Note

Assumed care of patient, asleep but easily aroused. No S/S of distress/SOB or pain.  
Instructed on POC and to call for assist PRN, will continue to monitor for changes Q1hr and 
PRN. Sitter at bedside.

## 2019-10-03 NOTE — NUR
Phone call received from patient's granddaughter who states she is the POA for the patient 
and she would like to receive status update on the patient. No password seen for this 
current admission, looked at prior admission and no password seen either. Informed 
granddaughter that due to HIPPA regulations I am unable to speak with her on phone regarding 
her family member. She notified me that everyone else was able to talk with her and she set 
up password on admission. She informed me that she would be visiting patient later today and 
I encouraged her to work on setting up password during visit.

## 2019-10-03 NOTE — NUR
Granddaughter Visit



Granddaughter at bedside, information was given regarding the request for transfer to the [facility] and contact information for Heydi Negron. She began to curse how she found it 
disrespectful that she has been calling all morning and RN did not return her phone call and 
that everyone else was able to provide information over the phone and found a copy of the 
POA so she can't understand why I could not see the information. I tried to explain to the 
granddaughter, however, she did not want to hear anything from RN and she does not want to 
see RN again. RN said thank you and left room.

## 2019-10-03 NOTE — NUR
Password



As per sitter in room, the patient notified her that the password is [email or web address]. Same was 
confirmed with the patient.

## 2019-10-03 NOTE — NUR
MD returned call

David Guevara NP returned call, updated on patient status and reason for call, orders 
received.  Continue care.

## 2019-10-03 NOTE — NUR
Phone call with MD



Spoke with Dr. Loya on the phone and informed him of patients request to be transferred 
to [facility]. Order for Social Service consult and to have family member speak with social 
worker in the morning regarding transfer.

## 2019-10-03 NOTE — NUR
[facility] 

Patient informed me that he has just decided not to eat or drink anything and that he wanted 
to [facility]. I asked him why he did not mention that to the MD 
during rounding this morning and he said that he didn't know. I notified patient and family 
member at bedside that I will notify the MD of request.

## 2019-10-04 VITALS — SYSTOLIC BLOOD PRESSURE: 119 MMHG | DIASTOLIC BLOOD PRESSURE: 73 MMHG

## 2019-10-04 VITALS — SYSTOLIC BLOOD PRESSURE: 127 MMHG | DIASTOLIC BLOOD PRESSURE: 87 MMHG

## 2019-10-04 VITALS — SYSTOLIC BLOOD PRESSURE: 142 MMHG | DIASTOLIC BLOOD PRESSURE: 107 MMHG

## 2019-10-04 VITALS — SYSTOLIC BLOOD PRESSURE: 137 MMHG | DIASTOLIC BLOOD PRESSURE: 78 MMHG

## 2019-10-04 VITALS — SYSTOLIC BLOOD PRESSURE: 140 MMHG | DIASTOLIC BLOOD PRESSURE: 88 MMHG

## 2019-10-04 RX ADMIN — FAMOTIDINE SCH MG: 20 TABLET, FILM COATED ORAL at 22:10

## 2019-10-04 RX ADMIN — FAMOTIDINE SCH MG: 20 TABLET, FILM COATED ORAL at 09:51

## 2019-10-04 RX ADMIN — SODIUM CHLORIDE SCH MLS/HR: 0.9 INJECTION, SOLUTION INTRAVENOUS at 09:51

## 2019-10-04 RX ADMIN — Medication SCH ML: at 08:00

## 2019-10-04 RX ADMIN — Medication SCH STRIP: at 06:30

## 2019-10-04 RX ADMIN — HUMAN INSULIN SCH UNITS: 100 INJECTION, SOLUTION SUBCUTANEOUS at 18:17

## 2019-10-04 RX ADMIN — HUMAN INSULIN SCH UNITS: 100 INJECTION, SOLUTION SUBCUTANEOUS at 11:30

## 2019-10-04 RX ADMIN — ATORVASTATIN CALCIUM SCH MG: 20 TABLET, FILM COATED ORAL at 22:10

## 2019-10-04 RX ADMIN — Medication SCH STRIP: at 11:35

## 2019-10-04 RX ADMIN — Medication SCH STRIP: at 22:10

## 2019-10-04 RX ADMIN — CLOPIDOGREL BISULFATE SCH MG: 75 TABLET ORAL at 09:51

## 2019-10-04 RX ADMIN — Medication SCH STRIP: at 17:00

## 2019-10-04 RX ADMIN — HYDROCODONE BITARTRATE AND ACETAMINOPHEN PRN TAB: 5; 325 TABLET ORAL at 06:30

## 2019-10-04 RX ADMIN — HUMAN INSULIN SCH UNITS: 100 INJECTION, SOLUTION SUBCUTANEOUS at 06:31

## 2019-10-04 RX ADMIN — HUMAN INSULIN SCH UNITS: 100 INJECTION, SOLUTION SUBCUTANEOUS at 22:11

## 2019-10-04 RX ADMIN — CEFTRIAXONE SODIUM SCH MLS/HR: 1 INJECTION, POWDER, FOR SOLUTION INTRAMUSCULAR; INTRAVENOUS at 09:50

## 2019-10-04 RX ADMIN — Medication SCH ML: at 11:36

## 2019-10-04 RX ADMIN — Medication SCH ML: at 18:07

## 2019-10-04 NOTE — NUR
PATIENT ROUNDS

PATIENT LYING IN BED, NO DISTRESS NOTED.  BED IN LOWEST POSITION, SIDE RAILS UP X2, CALL 
LIGHT WITHIN REACH.  WILL CONTINUE TO MONITOR.  SITTER AT BEDSIDE

## 2019-10-04 NOTE — NUR
I faxed transfer request along with updated clinical information to [facility].

## 2019-10-04 NOTE — NUR
IV removal

IV leaking and catheter bent. IV DC'd with sterile technique, catheter fully intact. 
Pressure dressing applied to site. Patient tolerated procedure well.  New IV will be 
started.

## 2019-10-04 NOTE — NUR
IV insertion

IV access obtained, via clean sterile technique by inserting 22 gauge catheter at right 
forearm after  attempt(s). IV secured properly. No trauma to site. Patient tolerated 
procedure well.

## 2019-10-04 NOTE — NUR
Opening Shift Note

Assumed care of patient, awake and A & O X3.  No S/S of distress/SOB or pain. HARGROVE PATENT 
AND DRAINING TO GRAVITY. BRUISE TO INNER RIGHT THIGH NOTED. BED IN LOWEST POSITION AND 
LOCKED. CALL BELL WITHIN REACH. BED ALARM ON. SITTER AT BEDSIDE. Instructed on POC and to 
call for assist PRN, will continue to monitor for changes Q1hr and PRN.

## 2019-10-04 NOTE — NUR
I spoke with patient to update him on the [facility].  He gave 
me permission to speak with/relay information to his grand daughter Carmen Osorio.  I 
called granddaughter Carmen Osorio [phone] to let her know that there were no beds 
available today for transfer.

## 2019-10-05 VITALS — SYSTOLIC BLOOD PRESSURE: 137 MMHG | DIASTOLIC BLOOD PRESSURE: 81 MMHG

## 2019-10-05 VITALS — SYSTOLIC BLOOD PRESSURE: 132 MMHG | DIASTOLIC BLOOD PRESSURE: 85 MMHG

## 2019-10-05 VITALS — SYSTOLIC BLOOD PRESSURE: 148 MMHG | DIASTOLIC BLOOD PRESSURE: 87 MMHG

## 2019-10-05 VITALS — DIASTOLIC BLOOD PRESSURE: 74 MMHG | SYSTOLIC BLOOD PRESSURE: 144 MMHG

## 2019-10-05 RX ADMIN — ATORVASTATIN CALCIUM SCH MG: 20 TABLET, FILM COATED ORAL at 22:11

## 2019-10-05 RX ADMIN — Medication SCH STRIP: at 17:25

## 2019-10-05 RX ADMIN — HUMAN INSULIN SCH UNITS: 100 INJECTION, SOLUTION SUBCUTANEOUS at 06:33

## 2019-10-05 RX ADMIN — DOCUSATE SODIUM SCH MG: 100 CAPSULE, LIQUID FILLED ORAL at 22:11

## 2019-10-05 RX ADMIN — Medication SCH STRIP: at 11:30

## 2019-10-05 RX ADMIN — CEFTRIAXONE SODIUM SCH MLS/HR: 1 INJECTION, POWDER, FOR SOLUTION INTRAMUSCULAR; INTRAVENOUS at 09:20

## 2019-10-05 RX ADMIN — Medication SCH ML: at 12:00

## 2019-10-05 RX ADMIN — Medication SCH ML: at 18:00

## 2019-10-05 RX ADMIN — Medication SCH STRIP: at 22:12

## 2019-10-05 RX ADMIN — HUMAN INSULIN SCH UNITS: 100 INJECTION, SOLUTION SUBCUTANEOUS at 17:00

## 2019-10-05 RX ADMIN — FAMOTIDINE SCH MG: 20 TABLET, FILM COATED ORAL at 22:12

## 2019-10-05 RX ADMIN — HUMAN INSULIN SCH UNITS: 100 INJECTION, SOLUTION SUBCUTANEOUS at 11:30

## 2019-10-05 RX ADMIN — FAMOTIDINE SCH MG: 20 TABLET, FILM COATED ORAL at 09:20

## 2019-10-05 RX ADMIN — Medication SCH ML: at 08:00

## 2019-10-05 RX ADMIN — Medication SCH STRIP: at 06:33

## 2019-10-05 RX ADMIN — HUMAN INSULIN SCH UNITS: 100 INJECTION, SOLUTION SUBCUTANEOUS at 22:12

## 2019-10-05 RX ADMIN — CLOPIDOGREL BISULFATE SCH MG: 75 TABLET ORAL at 09:20

## 2019-10-05 NOTE — NUR
RECEIVED PATIENT FROM DAY SHIFT. PATIENT REST IN BED. NO S/S OF DISTRESS NOTED. DENIED PAIN 
FOR NOW. PATIENT REFUSED TO HAVE DINNER TONIGHT AND STATED HE'S NOT HUNGRY. ENCOURAGED 
PATIENT TO DRINK ENSURE AND EAT SOMETHING. PATIENT STATED THAT HE WILL TRY LATER. HARGROVE CATH 
IN PLACE DRAINING GRAVITY. POC INSTRUCTED AND ENCOURAGED PATIENT TO CALL FOR ASSISTANT IF 
NEEDED. BED IN LOWEST POSITION WITH SIDE RAILS UP X 2. CALL BELL WITH IN REACH. ALARM ON. 
SITTER AT BEDSIDE FOR SAFETY. CONTINUE TO MONITOR FOR CHANGES Q1H AND PRN.

## 2019-10-05 NOTE — NUR
PT

Patient refused to be OOB during morning PT visit with c/o pain to penile and discomfort of 
not able to do BM. 

-------------------------------------------------------------------------------

Addendum: 10/05/19 at 0912 by MCKAYLA ZARATE PTT

-------------------------------------------------------------------------------

Amended: Links added.

## 2019-10-05 NOTE — NUR
Opening Shift Note

Assumed care of patient, awake and alert x3.  No S/S of distress/SOB or pain. Fox Catheter 
in place/intact and draining well. Bed at lowest locked position, bed side rails up x2 and 
call light within reach. Instructed on POC and to call for assist PRN, will continue to 
monitor for changes Q1hr and PRN. Sitter at bedside for safety.

## 2019-10-05 NOTE — NUR
REPOSITIONED PATIENT. PATIENT TOLERATED.WELL. NO S/S OF DISTRESS NOTED SITTER AT BEDSIDE FOR 
SAFETY. CONTINUE CARE.

## 2019-10-06 VITALS — SYSTOLIC BLOOD PRESSURE: 123 MMHG | DIASTOLIC BLOOD PRESSURE: 85 MMHG

## 2019-10-06 VITALS — DIASTOLIC BLOOD PRESSURE: 90 MMHG | SYSTOLIC BLOOD PRESSURE: 144 MMHG

## 2019-10-06 VITALS — SYSTOLIC BLOOD PRESSURE: 132 MMHG | DIASTOLIC BLOOD PRESSURE: 90 MMHG

## 2019-10-06 VITALS — DIASTOLIC BLOOD PRESSURE: 86 MMHG | SYSTOLIC BLOOD PRESSURE: 143 MMHG

## 2019-10-06 VITALS — DIASTOLIC BLOOD PRESSURE: 85 MMHG | SYSTOLIC BLOOD PRESSURE: 123 MMHG

## 2019-10-06 LAB
ANION GAP SERPL CALCULATED.3IONS-SCNC: 11 MMOL/L (ref 5–15)
BUN SERPL-MCNC: 10 MG/DL (ref 7–18)
BUN/CREAT SERPL: 16.1
CALCIUM SERPL-MCNC: 8.5 MG/DL (ref 8.5–10.1)
CHLORIDE SERPL-SCNC: 107 MMOL/L (ref 98–107)
CO2 SERPL-SCNC: 22 MMOL/L (ref 21–32)
GLUCOSE SERPL-MCNC: 158 MG/DL (ref 74–106)
HCT VFR BLD AUTO: 38.1 % (ref 41–53)
HGB BLD-MCNC: 12.2 G/DL (ref 13.5–17.5)
MCH RBC QN AUTO: 27.8 PG (ref 28–32)
MCV RBC AUTO: 86.7 FL (ref 80–100)
NRBC BLD QL AUTO: 0 %
POTASSIUM SERPL-SCNC: 3.4 MMOL/L (ref 3.5–5.1)
SODIUM SERPL-SCNC: 140 MMOL/L (ref 136–145)

## 2019-10-06 RX ADMIN — Medication SCH ML: at 12:00

## 2019-10-06 RX ADMIN — FAMOTIDINE SCH MG: 20 TABLET, FILM COATED ORAL at 09:24

## 2019-10-06 RX ADMIN — HUMAN INSULIN SCH UNITS: 100 INJECTION, SOLUTION SUBCUTANEOUS at 17:00

## 2019-10-06 RX ADMIN — Medication SCH ML: at 18:00

## 2019-10-06 RX ADMIN — FAMOTIDINE SCH MG: 20 TABLET, FILM COATED ORAL at 22:11

## 2019-10-06 RX ADMIN — DOCUSATE SODIUM SCH MG: 100 CAPSULE, LIQUID FILLED ORAL at 09:24

## 2019-10-06 RX ADMIN — HUMAN INSULIN SCH UNITS: 100 INJECTION, SOLUTION SUBCUTANEOUS at 11:30

## 2019-10-06 RX ADMIN — DOCUSATE SODIUM SCH MG: 100 CAPSULE, LIQUID FILLED ORAL at 22:11

## 2019-10-06 RX ADMIN — Medication SCH ML: at 08:00

## 2019-10-06 RX ADMIN — Medication SCH STRIP: at 17:00

## 2019-10-06 RX ADMIN — Medication SCH STRIP: at 06:42

## 2019-10-06 RX ADMIN — CEFTRIAXONE SODIUM SCH MLS/HR: 1 INJECTION, POWDER, FOR SOLUTION INTRAMUSCULAR; INTRAVENOUS at 09:23

## 2019-10-06 RX ADMIN — HYDROCODONE BITARTRATE AND ACETAMINOPHEN PRN TAB: 5; 325 TABLET ORAL at 04:47

## 2019-10-06 RX ADMIN — ATORVASTATIN CALCIUM SCH MG: 20 TABLET, FILM COATED ORAL at 22:11

## 2019-10-06 RX ADMIN — CLOPIDOGREL BISULFATE SCH MG: 75 TABLET ORAL at 09:24

## 2019-10-06 RX ADMIN — HUMAN INSULIN SCH UNITS: 100 INJECTION, SOLUTION SUBCUTANEOUS at 22:00

## 2019-10-06 RX ADMIN — Medication SCH STRIP: at 11:50

## 2019-10-06 RX ADMIN — Medication SCH STRIP: at 22:11

## 2019-10-06 RX ADMIN — HUMAN INSULIN SCH UNITS: 100 INJECTION, SOLUTION SUBCUTANEOUS at 06:42

## 2019-10-06 NOTE — NUR
END OF SHIFT NOTE

PATIENT IS COMFORTABLY RESTING IN BED. BED IN LOWEST LOCKED POSITION AND CALL LIGHT AT 
BEDSIDE. NO S/S OF SOB/DISTRESS NOTED. SITTER AT BEDSIDE FOR SAFETY. CARE ENDORSE TO YANCI GRESHAM.

## 2019-10-06 NOTE — NUR
Opening Shift Note

Assumed care of patient post report from Sydni GRESHAM. Patient is awake, alert, and oriented x3, 
resting in bed comfortably. Denies any pain or distress. RR even and unlabored with equal 
rise and fall on room air. Patient repositioned in bed to supine position for comfort. HOB 
elevated greater than 30 degrees. Bed in lowest position, side rails up X2, bed brakes set, 
bed alarm set. Sitter at bed side. Continue close monitoring.

## 2019-10-06 NOTE — NUR
Patients daughter , Ness at bedside. Updated Ness on POC. 

Patient requesting for home medication Nortriptyline. Will notify MD.

## 2019-10-07 VITALS — DIASTOLIC BLOOD PRESSURE: 73 MMHG | SYSTOLIC BLOOD PRESSURE: 124 MMHG

## 2019-10-07 VITALS — DIASTOLIC BLOOD PRESSURE: 77 MMHG | SYSTOLIC BLOOD PRESSURE: 131 MMHG

## 2019-10-07 VITALS — DIASTOLIC BLOOD PRESSURE: 73 MMHG | SYSTOLIC BLOOD PRESSURE: 127 MMHG

## 2019-10-07 VITALS — DIASTOLIC BLOOD PRESSURE: 81 MMHG | SYSTOLIC BLOOD PRESSURE: 132 MMHG

## 2019-10-07 VITALS — SYSTOLIC BLOOD PRESSURE: 124 MMHG | DIASTOLIC BLOOD PRESSURE: 73 MMHG

## 2019-10-07 RX ADMIN — Medication SCH ML: at 09:54

## 2019-10-07 RX ADMIN — Medication SCH ML: at 12:00

## 2019-10-07 RX ADMIN — CLOPIDOGREL BISULFATE SCH MG: 75 TABLET ORAL at 09:56

## 2019-10-07 RX ADMIN — Medication SCH STRIP: at 12:25

## 2019-10-07 RX ADMIN — FAMOTIDINE SCH MG: 20 TABLET, FILM COATED ORAL at 09:56

## 2019-10-07 RX ADMIN — CEFTRIAXONE SODIUM SCH MLS/HR: 1 INJECTION, POWDER, FOR SOLUTION INTRAMUSCULAR; INTRAVENOUS at 09:54

## 2019-10-07 RX ADMIN — HUMAN INSULIN SCH UNITS: 100 INJECTION, SOLUTION SUBCUTANEOUS at 11:30

## 2019-10-07 RX ADMIN — Medication SCH STRIP: at 06:25

## 2019-10-07 RX ADMIN — DOCUSATE SODIUM SCH MG: 100 CAPSULE, LIQUID FILLED ORAL at 09:56

## 2019-10-07 RX ADMIN — HUMAN INSULIN SCH UNITS: 100 INJECTION, SOLUTION SUBCUTANEOUS at 06:26

## 2019-10-07 NOTE — NUR
PATIENT DISCHARGED 

Patient assisted to the car by RN Blair Chow and myself Alla. Patient was transferred into 
car safely. The granddaughter that was driving patient to [place] stated that she was 
okay and could care for the patient from there. All patient's belongings with patient.

## 2019-10-07 NOTE — NUR
DISCHARGE NOTE 

Day shift RN, Yesenia has removed patient's IV and tele box. Tele box sent to ICU to 
monitor techs. Patient's bands also removed by Yesenia

## 2019-10-07 NOTE — NUR
I called the [facility] [phone] and spoke with Balbina, he said they have no beds 
available at this time.

## 2019-10-07 NOTE — NUR
CALLED ZINA FELIPE AND NOTIFIED HER GRAND DAUGHTER DOES NOT WANT SNF PLACEMENT, ZINA REPORTS 
SHE WILL TELL .

## 2019-10-07 NOTE — NUR
CALLED PBX AND PAGED PHYSICAL THERAPY AGAIN. ASKED JULES GRESHAM TO ASSIST PATIENT OUT AND CALLED 
ORDERLY TO HELP. NO ORDERLY AVAILABLE. JULES GRESHAM GOING TO ROOM TO ASSIST PT TO CAR.

## 2019-10-07 NOTE — NUR
PT AND GRAND DAUGHTER REPORT PATIENT HAS MEDICATIONS IN PHARMACY. NO POM BAND ON PT WRIST OR 
REMINDER IN CHART. CALLED PHARMACY, PHARMACY REPORTS PT HAS NO MEDICATIONS WITH THEM. ASKED 
PHARMACIST TO CHECK AGAIN. PHARMACY REPORTS THEY ARE UNABLE TO FIND ANY MEDICATIONS. 
NOTIFIED PT AND GRAND DAUGHTER. OFFERED TO CALL CHARGE NURSE TO ASSIST. PT AND GRAND 
DAUGHTER REPORT, "IT'S OKAY, [facility] ARE [facility] TO THE [facility], AND THEY CAN PRESCRIBE HIM 
MORE." CALLED PHYSICAL THERAPY TO ASSIST PATIENT TO CAR, PT OFFERED TO HELP EARLIER. 
AWAITING PHYSICAL THERAPIST TO COME ASSIST PATIENT.

## 2019-10-07 NOTE — NUR
SPOKE WITH DR NAJMA MD REPORTS THERE [facility] AT [facility] SO PATIENT NEEDS REHAB PLACEMENT. 
PT GRAND DAUGHTER OMI CALLED AND WAS UPDATED ON POC. GRAND DAUGHTER REPORTS PATIENT HAS 
TO GO TO [facility], THAT HE WAS TREATED BADLY AT [facility]. NOTIFIED MD. DR YAO REPORTS TO 
NOTIFY JEFRY BENOIT.

-------------------------------------------------------------------------------

Addendum: 10/07/19 at 1137 by KAIMNI HERNADEZ RN

-------------------------------------------------------------------------------

DR YAO REPORTS IF THERE ARE NO [facility] AT [facility] AND PATIENT DOES NOT WANT SNF REHAB, 
THEN PATIENT CAN ALSO BE DISCHARGED HOME, AND TO CALL .

## 2019-10-07 NOTE — NUR
assessment

Patient is a 78 year old male. Patients caregiver and FAROOQ Arzola informed me prior to 
admission patient was at [facility]. Per Ness she wants patient transferred to 
[facility]. I informed Ness that the  has been working on the transfer and 
the [facility] no [facility]. Per Ness she wants patient discharged home. Ness 
informed me she did not like the care provided at [facility]. Per Ness patient not to 
return on discharge. Dr Loya has been notified. Per Ness she will transport patient 
home on discharge. 

-------------------------------------------------------------------------------

Addendum: 10/07/19 at 1356 by Heydi MENG

-------------------------------------------------------------------------------

Amended: Links added.

## 2019-10-07 NOTE — NUR
CNA REPORTS PATIENT SPOEARNESTINE YUAN WITH GRAND DAUGHTER ON PHONE AND REPORT GRAND DAUGHTER WILL BE 
HERE BETWEEN 4 AND 5 PM TO  PATIENT TO TAKE HIM TO THE [facility]. NOTIFIED ZINA FELIPE. ZINA 
REPORTS SHE CALLED VA, STILL NO BEDS AVAILABLE, AND SHE WILL HAVE  GIVE THE 
GRAND DAUGHTER A CALL AND CLARIFY HER PLANS.

## 2019-10-07 NOTE — NUR
PT REFUSED P.T TODAY. NOTIFIED MP SUÁREZ. 

-------------------------------------------------------------------------------

Addendum: 10/07/19 at 1153 by BELLA MCCRYA PTT

-------------------------------------------------------------------------------

Amended: Links added.

## 2019-10-07 NOTE — NUR
SPOKE WITH CNA, PT REFUSED BREAKFAST AND LUNCH. ENCOURAGED PT TO EAT. PT REPORTS HE, "JUST 
DOESN'T WANT TO," AND "I'LL BE ALRIGHT." ENCOURAGED PT TO DRINK HIS GLUCERNA. PT REFUSED. 
SPOKE WITH DR NAJMA MD REPORTS HE IS DISCHARGING PT HOME AND TO [facility] HARGROVE.

## 2025-01-15 NOTE — NUR
PT RESTING IN BED NO DISTRESS NOTED. PT REPORTS NO PAIN AT THIS TIME. PT ON BEDPAN. BEDPAN 
REMOVED, SMALL SMEAR OF BROWN STOOL NOTED. PT CLEANED AND Z GUARD APPLIED TO BOTTOM. PT 
REPOSITIONED TO SUPINE. 100 MLS CLEAR YELLOW URINE NOTED IN HARGROVE. PT REPOSITIONED TO 
SUPINE, SITTER AT BEDSIDE. WILL CONTINUE TO MONITOR. Performed Resulted